# Patient Record
Sex: FEMALE | Race: WHITE | Employment: UNEMPLOYED | URBAN - METROPOLITAN AREA
[De-identification: names, ages, dates, MRNs, and addresses within clinical notes are randomized per-mention and may not be internally consistent; named-entity substitution may affect disease eponyms.]

---

## 2018-08-08 ENCOUNTER — HOSPITAL ENCOUNTER (EMERGENCY)
Age: 63
Discharge: STILL ADMITTED OR ADMINISTRATIVE | End: 2018-08-08
Attending: EMERGENCY MEDICINE
Payer: COMMERCIAL

## 2018-08-08 PROCEDURE — 75810000275 HC EMERGENCY DEPT VISIT NO LEVEL OF CARE

## 2022-10-22 ENCOUNTER — HOSPITAL ENCOUNTER (EMERGENCY)
Age: 67
Discharge: HOME | End: 2022-10-22
Attending: EMERGENCY MEDICINE
Payer: MEDICARE

## 2022-10-22 VITALS
OXYGEN SATURATION: 98 % | WEIGHT: 120 LBS | HEART RATE: 84 BPM | BODY MASS INDEX: 21.26 KG/M2 | RESPIRATION RATE: 20 BRPM | TEMPERATURE: 98 F | HEIGHT: 63 IN | DIASTOLIC BLOOD PRESSURE: 65 MMHG | SYSTOLIC BLOOD PRESSURE: 139 MMHG

## 2022-10-22 DIAGNOSIS — B89 PARASITOSIS: Primary | ICD-10-CM

## 2022-10-22 PROCEDURE — 99282 EMERGENCY DEPT VISIT SF MDM: CPT

## 2022-10-23 NOTE — ED TRIAGE NOTES
Pt presents to ED ambulatory from triage with c/o \"shedding dead worm for 7 weeks\";  pt having a hard time communicating;  she is stuttering and very anxious;  pt sts she went to [facility] and a stool sample was sent off but \"they put specimen in wrong containers and messed sample up\"

## 2022-10-23 NOTE — DISCHARGE INSTRUCTIONS
No gross evidence of parasites found in previous stool sample. It is possible there are other evaluations or skin scrapings to test for parasitosis otherwise neuralgia or another diagnosis should be strongly entertained.

## 2022-10-24 NOTE — ED PROVIDER NOTES
EMERGENCY DEPARTMENT HISTORY AND PHYSICAL EXAM    Date: 10/22/2022  Patient Name: Angelina Montana    History of Presenting Illness     Chief Complaint   Patient presents with    Mental Health Problem         History Provided By: Patient    Additional History (Context):   10:00 PM  Angelina Montana is a 73 y.o. female with PMHX of anxiety, arthritis, chronic pain syndrome, depression, reflux, neuromuscular disorder who presents to the emergency department C/O \"I have worms\". Patient presents complaining of cutaneous hyperesthesias and active worm infection. She states that this is a recurrent problem yet people often do not believe her. She states she is seeing regular physician in addition to emergency department for several visits. She shows me pictures on her cell phone and stool but without grossly visible helminthic etiology. She denies any travel or trips to [facility]. She denies any unfiltered water. Social History  She is a smoker, denies alcohol or drugs. Family History  No specific family history      PCP: Julienne Felty, MD    Current Outpatient Medications   Medication Sig Dispense Refill    oxyCODONE-acetaminophen (PERCOCET) 5-325 mg per tablet Take 1 Tab by mouth every four (4) hours as needed for Pain. 60 Tab 0    cyclobenzaprine (FLEXERIL) 10 mg tablet Take 1 Tab by mouth three (3) times daily as needed for Muscle Spasm(s). 60 Tab 0    clorazepate (TRANXENE) 15 mg tablet Take 15 mg by mouth two (2) times a day. temazepam (RESTORIL) 30 mg capsule Take  by mouth nightly as needed.            Past History     Past Medical History:  Past Medical History:   Diagnosis Date    Arthritis     djd spine    Other ill-defined conditions     hot flashes    Other ill-defined conditions     left arm numbness    Other ill-defined conditions     torn rotator cuff    Other ill-defined conditions     cyst on back    Psychiatric disorder     anxiety       Past Surgical History:  Past Surgical History:   Procedure Laterality Date    HX BREAST BIOPSY      right       Family History:  No family history on file. Social History:  Social History     Tobacco Use    Smoking status: Every Day    Tobacco comments:     currently using patch   Substance Use Topics    Alcohol use: No    Drug use: No       Allergies:  No Known Allergies      Review of Systems   Review of Systems   Constitutional: Negative. HENT: Negative. Eyes: Negative. Respiratory: Negative. Cardiovascular: Negative. Gastrointestinal:  Positive for diarrhea (Loose stool not liquid diarrhea). Endocrine: Negative. Genitourinary: Negative. Musculoskeletal: Negative. Skin: Negative. Itching     Allergic/Immunologic: Negative. Reports recurrent worm infections. Neurological: Negative. Hematological: Negative. Psychiatric/Behavioral:  The patient is nervous/anxious. All other systems reviewed and are negative. Physical Exam     Vitals:    10/22/22 2114 10/22/22 2119   BP:  139/65   Pulse:  84   Resp:  20   Temp: 98 °F (36.7 °C)    SpO2:  98%   Weight: 54.4 kg (120 lb)    Height: 5' 3\" (1.6 m)      Physical Exam  Vitals and nursing note reviewed. Constitutional:       General: She is not in acute distress. Appearance: She is well-developed. She is not diaphoretic. HENT:      Head: Normocephalic and atraumatic. Eyes:      General: No scleral icterus. Extraocular Movements:      Right eye: Normal extraocular motion. Left eye: Normal extraocular motion. Conjunctiva/sclera: Conjunctivae normal.      Pupils: Pupils are equal, round, and reactive to light. Neck:      Trachea: No tracheal deviation. Cardiovascular:      Rate and Rhythm: Normal rate and regular rhythm. Heart sounds: Normal heart sounds. Pulmonary:      Effort: Pulmonary effort is normal. No respiratory distress. Breath sounds: Normal breath sounds. No stridor.    Abdominal:      General: Bowel sounds are normal. There is no distension. Palpations: Abdomen is soft. Tenderness: There is no abdominal tenderness. There is no rebound. Musculoskeletal:         General: No tenderness. Normal range of motion. Cervical back: Normal range of motion and neck supple. Comments: Grossly unremarkable without abnormalities   Skin:     General: Skin is warm and dry. Capillary Refill: Capillary refill takes less than 2 seconds. Findings: No erythema or rash. Neurological:      Mental Status: She is alert and oriented to person, place, and time. GCS: GCS eye subscore is 4. GCS verbal subscore is 5. GCS motor subscore is 6. Cranial Nerves: No cranial nerve deficit. Motor: No weakness. Psychiatric:         Attention and Perception: Attention normal.         Mood and Affect: Mood is anxious. Behavior: Behavior is cooperative. Thought Content: Thought content normal.         Judgment: Judgment normal.      Comments: Patient stuttering repetitive in her history. She constantly repeats herself     Diagnostic Study Results     Labs -  No results found for this or any previous visit (from the past 24 hour(s)). Radiologic Studies -   No orders to display     CT Results  (Last 48 hours)      None          CXR Results  (Last 48 hours)      None            Medications given in the ED-  Medications - No data to display      Medical Decision Making   I am the first provider for this patient. I reviewed the vital signs, available nursing notes, past medical history, past surgical history, family history and social history. Vital Signs-Reviewed the patient's vital signs. Pulse Oximetry Analysis - 98% on room air    Records Reviewed: NURSING NOTES AND PREVIOUS MEDICAL RECORDS    Provider Notes (Medical Decision Making):   Patient with complaints of phlegm infection and I find no evidence of this. She is quite anxious and persistent in her arguments.   Review of her record shows multiple presentations to family practice and emergency room for the same complaint. She shows me multiple pictures. I carefully reviewed this woman's skin with Woods lamp and even otoscope or ophthalmoscope where she pointed out lesions to her skin and legs. Twice she has been treated with mebendazole and albendazole. Her warm tests have apparently been negative. She cannot deliver stool for me here. 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 Procedures:  Procedures    ED Course:   10:00 PM: Initial assessment performed. The patients presenting problems have been discussed, and they are in agreement with the care plan formulated and outlined with them. I have encouraged them to ask questions as they arise throughout their visit. Diagnosis and Disposition       DISCHARGE NOTE:  11:23 PM  Shannan Kerr's  results have been reviewed with her. She has been counseled regarding her diagnosis, treatment, and plan. She verbally conveys understanding and agreement of the signs, symptoms, diagnosis, treatment and prognosis and additionally agrees to follow up as discussed. She also agrees with the care-plan and conveys that all of her questions have been answered. I have also provided discharge instructions for her that include: educational information regarding their diagnosis and treatment, and list of reasons why they would want to return to the ED prior to their follow-up appointment, should her condition change. She has been provided with education for proper emergency department utilization. CLINICAL IMPRESSION:    1. Parasitosis        PLAN:  1. D/C Home  2. Discharge Medication List as of 10/22/2022 11:26 PM        3.    Follow-up Information       Follow up With Specialties Details Why Contact Info    Isatu Interiano MD Infectious Disease Physician   [address]  [phone]      Mahesh Cruz MD Infectious Disease Physician   [address] [place] [address]  [phone]            _______________________________    This note was partially transcribed via voice recognition software. Although efforts have been made to catch any discrepancies, it may contain sound alike words, grammatical errors, or nonsensical words.

## 2022-12-30 ENCOUNTER — HOSPITAL ENCOUNTER (EMERGENCY)
Age: 67
Discharge: HOME | End: 2022-12-30
Attending: EMERGENCY MEDICINE
Payer: MEDICARE

## 2022-12-30 VITALS
TEMPERATURE: 97.5 F | RESPIRATION RATE: 20 BRPM | HEART RATE: 82 BPM | SYSTOLIC BLOOD PRESSURE: 131 MMHG | HEIGHT: 63 IN | BODY MASS INDEX: 20.7 KG/M2 | OXYGEN SATURATION: 100 % | WEIGHT: 116.8 LBS | DIASTOLIC BLOOD PRESSURE: 64 MMHG

## 2022-12-30 DIAGNOSIS — F22 EKBOM'S DELUSIONAL PARASITOSIS (HCC): Primary | ICD-10-CM

## 2022-12-30 PROCEDURE — 87177 OVA AND PARASITES SMEARS: CPT

## 2022-12-30 PROCEDURE — 87324 CLOSTRIDIUM AG IA: CPT

## 2022-12-30 PROCEDURE — 87506 IADNA-DNA/RNA PROBE TQ 6-11: CPT

## 2022-12-30 PROCEDURE — 99282 EMERGENCY DEPT VISIT SF MDM: CPT

## 2022-12-30 NOTE — ED TRIAGE NOTES
Pt report to ed with c/o abdominal pain, diarrhea and parasite in stool (worms). pt report from pt, pt noticed parrasite in stool since sept. Pt have seen PCP for similar complaint, however PCP did not find any worms at this time. Pt report that while she was in the ed lobby waiting, she asked for a toilet had to used the bathroom. Pt report that she passed dead worms in stool. Pt was brought back to room, nurse noted tape Band-Aid on belly button. Pt verbalized to nurse that she put it there to prevent the worms from coming out. Small Band-Aid were also noted to left lower leg. Pt also pointed to left upper arm at what appeared to be by nurse a varicose vein, stating, \" see, that's the worm moving around\". Pt was tearful in room with nurse. Pt also appeared to be agitated. Pt was noted drinking coffee in room during nurse - patient interview. HX of  chronic back pain and anxiety.

## 2022-12-30 NOTE — ED PROVIDER NOTES
EMERGENCY DEPARTMENT HISTORY AND PHYSICAL EXAM    Date: 12/30/2022  Patient Name: Saira Linder    History of Presenting Illness     Chief Complaint   Patient presents with    Abdominal Pain    Diarrhea         History Provided By: Patient    Chief Complaint: diarrhea    HPI(Context):   10:21 AM  Saira Linder is a 78 y.o. female with PMHX of anxiety who presents to the emergency department C/O diarrhea. Pt notes there are 2 inch black and white worms in her stool. She notes she can see the worms crawling on her skin and points to moles and scars. Pt has had sxs for almost a year. She has seen multiple specialists including GI with no clear findings. Pt has been given multiple rounds of antiparasitic meds. Pt is requesting that I \"order all the tests\" on her stool. Pt has 2 toilet hats at bedside full of loose brown stool. She is requesting me to evaluate the stool and repeatedly holding open stool container and gesturing toward me. Pt denies fever, vomiting, bloody stools. and any other sxs or complaints. PCP: Kenn Rizo MD    Current Outpatient Medications   Medication Sig Dispense Refill    clorazepate (TRANXENE) 15 mg tablet Take 15 mg by mouth two (2) times a day. temazepam (RESTORIL) 30 mg capsule Take  by mouth nightly as needed. Past History     Past Medical History:  Past Medical History:   Diagnosis Date    Arthritis     djd spine    Other ill-defined conditions     hot flashes    Other ill-defined conditions     left arm numbness    Other ill-defined conditions     torn rotator cuff    Other ill-defined conditions     cyst on back    Psychiatric disorder     anxiety       Past Surgical History:  Past Surgical History:   Procedure Laterality Date    HX BREAST BIOPSY      right       Family History:  No family history on file.     Social History:  Social History     Tobacco Use    Smoking status: Every Day    Tobacco comments:     currently using patch   Substance Use Topics    Alcohol use: No    Drug use: No       Allergies:  No Known Allergies      Review of Systems   Review of Systems   Constitutional:  Negative for fever. Gastrointestinal:  Positive for diarrhea. Negative for abdominal pain, blood in stool and vomiting. Psychiatric/Behavioral:  Positive for agitation and suicidal ideas. All other systems reviewed and are negative. Physical Exam     Vitals:    12/30/22 1027   BP: 131/64   Pulse: 82   Resp: 20   Temp: 97.5 °F (36.4 °C)   SpO2: 100%   Weight: 53 kg (116 lb 12.8 oz)   Height: 5' 3\" (1.6 m)     Physical Exam  Vitals and nursing note reviewed. Constitutional:       General: She is not in acute distress. Appearance: She is well-developed. She is not diaphoretic. Comments: Anxious  female. Alert. HENT:      Head: Normocephalic and atraumatic. Right Ear: External ear normal.      Left Ear: External ear normal.      Nose: Nose normal.   Eyes:      General: No scleral icterus. Right eye: No discharge. Left eye: No discharge. Conjunctiva/sclera: Conjunctivae normal.   Cardiovascular:      Rate and Rhythm: Normal rate and regular rhythm. Heart sounds: Normal heart sounds. No murmur heard. No friction rub. No gallop. Pulmonary:      Effort: Pulmonary effort is normal. No tachypnea, accessory muscle usage or respiratory distress. Breath sounds: Normal breath sounds. No decreased breath sounds, wheezing, rhonchi or rales. Abdominal:      General: There is no distension. Palpations: Abdomen is soft. Tenderness: There is no abdominal tenderness. There is no guarding or rebound. Musculoskeletal:         General: Normal range of motion. Cervical back: Normal range of motion. Skin:     General: Skin is warm and dry. Neurological:      Mental Status: She is alert and oriented to person, place, and time. Psychiatric:         Mood and Affect: Mood is anxious. Affect is angry. Behavior: Behavior is agitated and aggressive. Judgment: Judgment normal.           Diagnostic Study Results     Labs -   No results found for this or any previous visit (from the past 12 hour(s)). Radiologic Studies     No orders to display     CT Results  (Last 48 hours)      None          CXR Results  (Last 48 hours)      None            Medications given in the ED-  Medications - No data to display      Medical Decision Making   I am the first provider for this patient. I reviewed the vital signs, available nursing notes, past medical history, past surgical history, family history and social history. Vital Signs-Reviewed the patient's vital signs. Pulse Oximetry Analysis - 100% on RA. NORMAL     Records Reviewed: Nursing Notes, Old Medical Records, Previous Radiology Studies, and Previous Laboratory Studies    Provider Notes (Medical Decision Making): food borne infection, viral, parasitosis, psych    Procedures:  Procedures    ED Course:   10:21 AM Initial assessment performed. The patients presenting problems have been discussed, and they are in agreement with the care plan formulated and outlined with them. I have encouraged them to ask questions as they arise throughout their visit. Diagnosis and Disposition       Stool studies ordered per patient request. Benign abdomen with no indication for CT. Pt has had sxs for nearly a year and has seen multiple specialists with no clear findings. Difficult to get history from patient as she repeatedly pointing to her skin claiming there are worms in her skin. She flashes her genitals toward me on multiple occassions despite my repeated requests to cover her private areas. 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 She was becoming more agitated and aggressive, so I discontinued conversation. LUNA Alegria was at bedside for full HPI and physical exam. Attending aware of case.          1. Ekbom's delusional parasitosis ([facility] [address]        PLAN:  1. D/C Home  2. Discharge Medication List as of 12/30/2022 11:30 AM        CONTINUE these medications which have NOT CHANGED    Details   clorazepate (TRANXENE) 15 mg tablet Take 15 mg by mouth two (2) times a day.  , Historical Med      temazepam (RESTORIL) 30 mg capsule Take  by mouth nightly as needed.  , Historical Med           STOP taking these medications       oxyCODONE-acetaminophen (PERCOCET) 5-325 mg per tablet Comments:   Reason for Stopping:         cyclobenzaprine (FLEXERIL) 10 mg tablet Comments:   Reason for Stopping:             3.   Follow-up Information       Follow up With Specialties Details Why Contact Info    Jermaine Nash MD Infectious Disease Physician  may follow up with infectious disease as requested [address]      [address] [facility],[address], Larry Napoles MD Family Medicine   [facility] [address] [address] [phone]      [facility] EMERGENCY DEPT Emergency Medicine   [address] [place] [address]  [phone]          _______________________________    Attestations: This note is prepared by Stacia Duncan PA-C.  _______________________________        Please note that this dictation was completed with [employer], the computer voice recognition software. Quite often unanticipated grammatical, syntax, homophones, and other interpretive errors are inadvertently transcribed by the computer software. Please disregard these errors. Please excuse any errors that have escaped final proofreading.

## 2022-12-30 NOTE — ED NOTES
I have reviewed discharge instructions with the patient. The patient verbalized understanding.  Patient leaves discharge instructions in room, patient returns to registration desk for discharge papers

## 2022-12-31 LAB
C DIFF GDH STL QL: POSITIVE
C DIFF TOX A+B STL QL IA: POSITIVE
CAMPYLOBACTER SPECIES, DNA: NEGATIVE
ENTEROTOXIGEN E COLI, DNA: NEGATIVE
INTERPRETATION: ABNORMAL
P SHIGELLOIDES DNA STL QL NAA+PROBE: NEGATIVE
SALMONELLA SPECIES, DNA: NEGATIVE
SHIGA TOXIN PRODUCING, DNA: NEGATIVE
SHIGELLA SP+EIEC IPAH STL QL NAA+PROBE: NEGATIVE
VIBRIO SPECIES, DNA: NEGATIVE
Y. ENTEROCOLITICA, DNA: NEGATIVE

## 2022-12-31 RX ORDER — VANCOMYCIN HYDROCHLORIDE 125 MG/1
125 CAPSULE ORAL 4 TIMES DAILY
Qty: 56 CAPSULE | Refills: 0 | Status: SHIPPED | OUTPATIENT
Start: 2022-12-31 | End: 2023-01-14

## 2022-12-31 NOTE — CALL BACK NOTE
11:45 AM  Patient stool tested positive for C. difficile and sent oral vancomycin to preferred pharmacy. Left voicemail message for patient. Has no emergency contact. - bill pak

## 2022-12-31 NOTE — CALL BACK NOTE
1:17 PM  Patient returned phone call I informed her of her C. difficile diagnosis and adherence to oral medication regimen. Emphasized that it is the most important antibiotic that she will need to take. Patient had multiple questions have addressed them. She says that she has an upcoming infectious disease appointment and I encouraged her to keep that. Iterated to her that she is not \"passing worms. \"- bill pak

## 2023-01-07 ENCOUNTER — HOSPITAL ENCOUNTER (EMERGENCY)
Age: 68
Discharge: HOME | End: 2023-01-07
Attending: EMERGENCY MEDICINE
Payer: MEDICARE

## 2023-01-07 ENCOUNTER — APPOINTMENT (OUTPATIENT)
Dept: GENERAL RADIOLOGY | Age: 68
End: 2023-01-07
Attending: PHYSICIAN ASSISTANT
Payer: MEDICARE

## 2023-01-07 VITALS
DIASTOLIC BLOOD PRESSURE: 74 MMHG | HEART RATE: 82 BPM | OXYGEN SATURATION: 97 % | SYSTOLIC BLOOD PRESSURE: 156 MMHG | HEIGHT: 63 IN | WEIGHT: 113 LBS | TEMPERATURE: 98 F | BODY MASS INDEX: 20.02 KG/M2 | RESPIRATION RATE: 19 BRPM

## 2023-01-07 DIAGNOSIS — K59.00 CONSTIPATION, UNSPECIFIED CONSTIPATION TYPE: Primary | ICD-10-CM

## 2023-01-07 DIAGNOSIS — A04.72 C. DIFFICILE DIARRHEA: ICD-10-CM

## 2023-01-07 DIAGNOSIS — R10.84 ABDOMINAL PAIN, GENERALIZED: ICD-10-CM

## 2023-01-07 DIAGNOSIS — F22 EKBOM'S DELUSIONAL PARASITOSIS (HCC): ICD-10-CM

## 2023-01-07 PROCEDURE — 99283 EMERGENCY DEPT VISIT LOW MDM: CPT

## 2023-01-07 PROCEDURE — 74019 RADEX ABDOMEN 2 VIEWS: CPT

## 2023-01-07 RX ORDER — METRONIDAZOLE 500 MG/1
500 TABLET ORAL 3 TIMES DAILY
Qty: 30 TABLET | Refills: 0 | Status: SHIPPED | OUTPATIENT
Start: 2023-01-07 | End: 2023-01-17

## 2023-01-08 NOTE — ED TRIAGE NOTES
Pt reports that for the last 5 months she has been pooping dead shedding worms out. Pt reports she has had c.diff and asked who is the doctor on duty. Pt is not very clear of story.  [facility]

## 2023-01-08 NOTE — ED PROVIDER NOTES
EMERGENCY DEPARTMENT HISTORY AND PHYSICAL EXAM      Date: 1/7/2023  Patient Name: Gil Pham    History of Presenting Illness     Chief Complaint   Patient presents with    Abdominal Pain       History Provided By: Patient    HPI: Gil Pham, 78 y.o. female with past medical history to include psychiatric disorder, arthritis, chronic pain, delusion of parasitosis, currently being treated for C. difficile infection is presenting to the emergency department with a chief complaint of abdominal pain. Patient has a difficult time describing her abdominal pain as she is stuck on the fact that she has worms all over her body including in her right thigh and in her stool. She has multiple containers of stool at bedside which she is showing to this provider as well as a container of phlegm that she reports that she coughed up that has worms located in it. She denies fever and blood in stool. Patient also reported that she was only able to  off of the vancomycin that was prescribed to her due to cost reasons. There are no other complaints, changes, or physical findings at this time. Past History     Past Medical History:  Past Medical History:   Diagnosis Date    Arthritis     djd spine    Other ill-defined conditions     hot flashes    Other ill-defined conditions     left arm numbness    Other ill-defined conditions     torn rotator cuff    Other ill-defined conditions     cyst on back    Psychiatric disorder     anxiety       Past Surgical History:  Past Surgical History:   Procedure Laterality Date    HX BREAST BIOPSY      right       Family History:  No family history on file.     Social History:  Social History     Tobacco Use    Smoking status: Every Day    Tobacco comments:     currently using patch   Substance Use Topics    Alcohol use: No    Drug use: No       Allergies:  No Known Allergies    PCP: Elvia Jimenez MD    No current facility-administered medications on file prior to encounter. Current Outpatient Medications on File Prior to Encounter   Medication Sig Dispense Refill    clorazepate (TRANXENE) 15 mg tablet Take 15 mg by mouth two (2) times a day. temazepam (RESTORIL) 30 mg capsule Take  by mouth nightly as needed. Review of Systems   Review of Systems   Constitutional:  Negative for activity change, chills and fever. HENT:  Negative for congestion, ear pain, rhinorrhea, sneezing and sore throat. Eyes:  Negative for pain and visual disturbance. Respiratory:  Negative for cough and shortness of breath. Cardiovascular:  Negative for chest pain. Gastrointestinal:  Positive for abdominal pain. Negative for blood in stool, diarrhea, nausea and vomiting. Genitourinary:  Negative for dysuria and hematuria. Musculoskeletal:  Negative for gait problem. Skin:  Negative for rash. Neurological:  Negative for speech difficulty, weakness and headaches. Psychiatric/Behavioral:  The patient is not nervous/anxious. Delusions   All other systems reviewed and are negative. Physical Exam   Physical Exam  Vitals and nursing note reviewed. Constitutional:       General: She is not in acute distress. Appearance: Normal appearance. She is not ill-appearing or toxic-appearing. HENT:      Head: Normocephalic and atraumatic. Nose: Nose normal.      Mouth/Throat:      Mouth: Mucous membranes are moist.      Pharynx: Oropharynx is clear. Eyes:      Extraocular Movements: Extraocular movements intact. Conjunctiva/sclera: Conjunctivae normal.      Pupils: Pupils are equal, round, and reactive to light. Cardiovascular:      Rate and Rhythm: Normal rate. Pulses: Normal pulses. Heart sounds: Normal heart sounds. Pulmonary:      Effort: Pulmonary effort is normal. No respiratory distress. Breath sounds: Normal breath sounds. Abdominal:      General: Abdomen is flat. Bowel sounds are normal. There is no distension. Palpations: Abdomen is soft. Tenderness: There is generalized abdominal tenderness (mild). There is no guarding or rebound. Comments: Patient has a piece of gauze taped over the middle of her abdomen just below her umbilicus without any wound underneath of the gauze, no skin changes   Musculoskeletal:         General: No deformity or signs of injury. Normal range of motion. Cervical back: Normal range of motion. Skin:     General: Skin is warm and dry. Capillary Refill: Capillary refill takes less than 2 seconds. Findings: No rash. Neurological:      General: No focal deficit present. Mental Status: She is alert and oriented to person, place, and time. Cranial Nerves: No cranial nerve deficit. Psychiatric:         Attention and Perception: She is inattentive. She perceives visual (parasites) hallucinations. Mood and Affect: Mood is anxious. Speech: Speech is tangential.         Thought Content: Thought content does not include homicidal or suicidal ideation. Lab and Diagnostic Study Results   Labs -   No results found for this or any previous visit (from the past 12 hour(s)). Radiologic Studies -   XR Results (most recent):  Results from Hospital Encounter encounter on 01/07/23    XR ABD FLAT/ ERECT    Narrative  EXAM: XR ABD FLAT/ ERECT    CLINICAL INDICATION/HISTORY: abdominal pain    COMPARISON: None. TECHNIQUE: Supine and upright AP views of the abdomen were obtained.    _______________    FINDINGS:    Normal bowel gas pattern. No air distended bowel loops nor other evidence of  bowel obstruction or bowel dilatation. Moderate fecal material is seen  throughout the colon, most suggestive of constipation. The soft tissue planes and bony structures appear normal.    _______________    Impression  No bowel obstruction or other acute findings seen.       @lastxrresult@  CT Results  (Last 48 hours)      None          CXR Results  (Last 48 hours) None            Medical Decision Making and ED Course   Differential Diagnosis & Medical Decision Making Provider Note:   70-year-old female who is well-known to this facility and has a documented history of delusional parasitosis is presenting with abdominal pain. She is very distracted by her delusions. There is no visualization of any worms or parasites on her body. She has an abnormal affect. Her abdomen is soft and mildly generally tender. Her x-ray does reveal constipation and she has a recent diagnosis of C. Difficile from stool collection on 12/30. Stool negative for any ova or parasites. Patient has been unable to finish her vancomycin oral as prescribed to treat C. difficile due to the cost.  After discussion with ED attending, will change medication to Flagyl for cost effectiveness and patient compliance. This was explained to the patient who voices understanding. I also encouraged her to follow-up with GI specialist for her C. difficile diagnosis. Considered sepsis and toxic megacolon however abdomen relatively soft, no tachycardia, and afebrile. Discussed case with Dr. Rain Mcneal, ED Attending who advises flat and erect abdominal x-ray to rule out any free air or megacolon. Patient has stable vital signs, afebrile, no tachycardia, no concern for sepsis at this time. - I am the first provider for this patient. I reviewed the vital signs, available nursing notes, past medical history, past surgical history, family history and social history. The patients presenting problems have been discussed, and they are in agreement with the care plan formulated and outlined with them. I have encouraged them to ask questions as they arise throughout their visit. Vital Signs-Reviewed the patient's vital signs.   Patient Vitals for the past 12 hrs:   Temp Pulse Resp BP SpO2   01/07/23 1918 -- 82 19 -- --   01/07/23 1915 -- -- -- (!) 156/74 --   01/07/23 1914 98 °F (36.7 °C) -- -- -- 97 %       ED Course:        Procedures   Performed by: Juan M De Paz PA-C  Procedures      Disposition   Disposition: DC- Adult Discharges: All of the diagnostic tests were reviewed and questions answered. Diagnosis, care plan and treatment options were discussed. The patient understands the instructions and will follow up as directed. The patients results have been reviewed with them. They have been counseled regarding their diagnosis. The patient verbally convey understanding and agreement of the signs, symptoms, diagnosis, treatment and prognosis and additionally agrees to follow up as recommended with their PCP in 24 - 48 hours. They also agree with the care-plan and convey that all of their questions have been answered. I have also put together some discharge instructions for them that include: 1) educational information regarding their diagnosis, 2) how to care for their diagnosis at home, as well a 3) list of reasons why they would want to return to the ED prior to their follow-up appointment, should their condition change. Discharged     DISCHARGE PLAN:  1. Current Discharge Medication List        CONTINUE these medications which have NOT CHANGED    Details   vancomycin (VANCOCIN) 125 mg capsule Take 1 Capsule by mouth four (4) times daily for 14 days. Qty: 56 Capsule, Refills: 0      clorazepate (TRANXENE) 15 mg tablet Take 15 mg by mouth two (2) times a day. temazepam (RESTORIL) 30 mg capsule Take  by mouth nightly as needed. 2.   Follow-up Information       Follow up With Specialties Details Why Contact Info    Dick Guthrie MD Gastroenterology Schedule an appointment as soon as possible for a visit  for follow up from ER visit [address] Ami Cutler [address] [facility] [address] [facility]      [facility] EMERGENCY DEPT Emergency Medicine  As needed, If symptoms worsen [address] [facility] [address] [phone]          3. Return to ED if worse   4.    Discharge Medication List as of 1/7/2023  9:33 PM        START taking these medications    Details   metroNIDAZOLE (FlagyL) 500 mg tablet Take 1 Tablet by mouth three (3) times daily for 10 days. , Normal, Disp-30 Tablet, R-0           CONTINUE these medications which have NOT CHANGED    Details   clorazepate (TRANXENE) 15 mg tablet Take 15 mg by mouth two (2) times a day.  , Historical Med      temazepam (RESTORIL) 30 mg capsule Take  by mouth nightly as needed.  , Historical Med           STOP taking these medications       vancomycin (VANCOCIN) 125 mg capsule Comments:   Reason for Stopping:             Diagnosis/Clinical Impression     Clinical Impression:   1. Constipation, unspecified constipation type    2. Abdominal pain, generalized    3. C. difficile diarrhea    4. Ekbom's delusional parasitosis ([facility] [address]        Attestations: Jean Paul COLLAZO PA-C, am the primary clinician of record. Please note that this dictation was completed with [employer], the computer voice recognition software. Quite often unanticipated grammatical, syntax, homophones, and other interpretive errors are inadvertently transcribed by the computer software. Please disregard these errors. Please excuse any errors that have escaped final proofreading. Thank you.

## 2023-01-08 NOTE — DISCHARGE INSTRUCTIONS
Please stop taking the vancomycin, we will switch her antibiotics to Flagyl which is easier to obtain due to cost.  This will treat the bacterial infection of your colon. Your x-ray also shows some constipation but no other abnormalities.   Please follow-up with the GI specialist.

## 2023-01-12 ENCOUNTER — DOCUMENTATION ONLY (OUTPATIENT)
Dept: INFECTIOUS DISEASES | Age: 68
End: 2023-01-12

## 2023-01-12 NOTE — PROGRESS NOTES
This note has been imported from an outside medical record to facilitate physician to physician communication in the patient's care. Format may be different from original.       INFECTIOUS DISEASE CONSULT  HISTORY  Chief Complaint / [place] of Presenting Problem: Parasites  History Of Present Illness: Ms Shiraz Steele is a 77 yo female who is referred to ID clinic due to concern for a parascitic infection of the skin and bowels. She is accompanied by her sister to today's appointment. Note that the patient had missed 2 prior appointments at my office and this is her third time being re-scheduled. Ms. Shiraz Steele reflects that she has not been feeling well since at least August. She notes that she has been having intermittent diarrhea with espisodes of worms and their eggs being excreted from her stool. Ne notes that she sees long, black worms in her stool as well as crawling in her skin. She brings photos of her stool and points out the items of concern. She has been to see Dermatology as well as GI, her PCP, [facility] as well as the ED at [facility] in an attempt to get to the bottom of her illness. She has been perscribed 3 full courses of mebendazole (3 tabs x 3 days, repeated in 1 week per course) as well as 1 course of albendazole. She recently had gone to the ED on 12/30 for persistent diarrhea at which time stool for O&P was negative, stool for enteric pathogen testing was negative, but C.diff was +. She had first been given a script for po vancomycin which was too expensive and was therefore changed to po flagyl. Her sister states that the patient has been taking the Flagyl as perscribed but I am unable to confirm this with the patient. The patient states that she has been having less diarrhea. No fevers reported. No abdominal cramping or blood in the stool reported. She does follow with GI who has provided recommendations regarding C.diff per my review of outside records.      The patient describes worms that pop in and out of her skin, her umbilicus, and in her teeth. She notes that she sees a black worm with horns in both of her eyes if she closes her eyes or puts pressure on her eyelids. She tells me that she is not able to see anything but the worms from time to time. She notes that the parasite in her skin move and form black lines, sometime in a 'Y' shape. During the  visit she points out areas which cause her concern- feet, lower legs, and abdomen. She notes a cysts on her lower abdomen which was evaluated by gen Surgery with recommendations to hold on cyst removal given the patient's ongoing concerns for parasites in the cyst. Today she does not mention abdominal pain, although per chart review she has called her PCP and other providers every 5-7 days with this complaint. She notes that she only eats 1 meal per day and its from [employer] becasue it is the only food that she trusts. She has not been eating regularly since September. Her sister states that the patient seems to be losing weight. She lives with her sister near [place]. No pets are in the home. She has never travelled outside of the [facility] [place] than to occasionally visit her other sister in [place]. The patient's sister shares that she is quite worried about the patient's overaall well-being and that she has been declining physically and congitively since the episodes with seeing parasites has started. Medication List: Xanax 1 mg po qid  gabapentin 600 mg po tid  cloraepate 15 mg po daily    Flagyl (temporary)  Allergy List: NKDA  Past Medical History: Spinal stenosis  Lumabr disc protrusion  osteoporosis  Severe Anxiety disorder  Depression  Recurrent UTI  Fibromyalgia  Sebaceous cyst  Past Surgical History: Breast biopsy  Social History: Single, lives in a house in a [facility] area near [place] with her sister. No pets. No international travel. No travel outside of [place].  Works occasionally as a cleaning tech. + daily smoking. No recreational drugs  Family History: Mother: thyroid disease  Patient did not want to disclose the medical problems of her sisters  PCP: Funmi Mittal MD  REVIEW OF SYSTEMS  General: No fever, chills, or sweats reported. Sister reports weight loss and change in eating habits  Eyes: + worms and black spots in her vision  Ears/Nose/Mouth/Throat: No hearing loss, no sinus pain or pressure.  + discolration of teeth  + patient feels worms crawling into her teeth  Respiratory: No upper respiratory infections, dyspnea, cough or wheezing. Cardiovascular: No chest pain, tightness or palpitations. Gastrointestinal: + intermittent diarrhea  + prior abdominal pain- not reported to me today  + worms and eggs in stool since August    Genitourinary: No incontinence.  + burning and occasional dysuria  Musculoskeletal: No falls in last 12 months  No reported redness or swelling. No muscle cramps  + back pain   Neurological: No focal weakness. No headaches. No numbness or tingling. No seizures. No neuropathy  No gait ataxia reported. No falls reported. No loss of consciousness, transient ischemic symptoms, or seizures. Skin/Breast: + itching  + worms crawling in her skin  Psychiatric: +Anxiety, not sleeping  Endocrine: No increased thirst, hunger, or urination. No heat or cold intolerance. Hematological/Lymphatic: No increased peripheral edema or swelling. No unusual bleeding or bruising. No new masses. No signs or symptoms of bleeding  No reported lymph node enlargement. Allergic/Immunologic: No environmental allergies. VITAL SIGNS/CONSTITUTIONAL  Weight: 116.4 pounds; Pulse: 71 BPM; Blood Pressure: 136/76; O2 Saturation: 100 Room Air; Temperature: 97.1 Fahrenheit; Respiratory Rate: 14 Breaths per minute;  PHYSICAL EXAM  General: This patient is well developed and in no acute distress.   Eyes: Conjunctivae and lids appear normal.  Pupils equal, round, and normally reactive to light.  Ears/Nose/Mouth/Throat: Hearing grossly intact. Pharynx is not injected. Mouth without lesions. Overall appearance normal with no scars, lesions or masses. Neck: Supple without thyromegaly, lymphadenopathy, or masses. Respiratory: Normal respiratory effort. No crackles, wheezes or ronchi. Bases clear. Cardiovascular: RRR, s1 and s2, no murmurs or gallops. No peripheral edema. Edema/Varicosities of Extremities: + varicosities  Gastrointestinal: Soft, non-distended. Active bowel sounds throughout  Non-tender. No hepato-splenomegaly. Genitourinary: Deferred  Lymphatics: No palpable adenopathy. Musculoskeletal: Digits/nails: No clubbing, cyanosis, inflammation or ischemia. Gait/Station: Normal gait. No joint deformity, swelling, redness or muscle weakness. Full ROM of joints. No effusions noted. Muscle mass appropriate for age. Skin: Warm and dry. + patchy excoriation to right anterior lower leg- no erythema or draiange; + flaky dry skin  Small eschar to lower abdomen with palpable cyst    Notably, I did NOT visualize ANY lesions concerning for a parastici infection- no tracking, no lymphangitis, no linear excoriations, no abscesses. Specific attention to areas of concern patient pointed out- varicosities on lower legs with cellulite and age related skin changes. Pressure marks from clothing, sock and shoes. No bruising   Neurological: No focal weakness. Cranial nerves grossly intact. Mentation clear. Speech clear, pressured. Gait stable. No tremors. Difficult  historian. Psychiatric: Pressured speech. Difficult historian- unable to create linear timeline of events. Required frequent re-direction away from her phone photos and picking at her skin. Anxious. Repetative. Interrupts often with perseverating thoughts about worms. Difficult time answering questions directly.    LABS/RADIOLOGY/TESTS  Labs: 10/4/22 Stool O&P- negative    11/7/22   shiga toxin, Campylobacter Ag: negative  stool O&P- negative  Stool cultures- negative  12/28/22  O&P negative  12/30/22:   C.diff Ag +  Ova and parasite- negative  Enteric bacterial panel - negative    9/12/22: CT abd/pelvis:   IMPRESSION   Fluid filled small bowel and colon suggesting diarrheal disease/enteritis. 12/22/22 Upper GI with small bowel ft:   REPORT:    radiograph of the abdomen is unremarkable. There is a nonobstructing bowel gas pattern. Air-contrast images of the esophagus are unremarkable. There is normal esophageal motility with normal esophageal mucosal contour. No intraluminal filling defect, mucosal irregularity, or abnormal extrinsic compression. Gastroesophageal reflux is not elicited on the exam.     Air-contrast images of the stomach and duodenal bulb are normal.  No mucosal irregularity, intraluminal filling defect, or abnormal extrinsic compression. The stomach readily empties into the C sweep of the duodenum. There is a small duodenal diverticulum. There is a small bowel transit time of approximately 60 minutes. The small bowel has a normal mucosal contour. There is no mucosal irregularity, intraluminal filling defect, or abnormal extrinsic compression. The small bowel readily empties into the colon. The cecum is within the right pelvis and the visualized terminal ileum is normal.  Terminal ileum is difficult to visualize secondary to overlying bowel loops. 1/7/23 KUB: no bowel obstruction or other findings    CBC, BMP from [facility] reviewed from 10/26 and 9/22- specifically no abnormal Eosinophils reported  DIAGNOSIS AND ASSESSMENT  Assessment:  CPT Codes:  91490, 34394, 99607  ICD Codes:  A04.72: Clostridium difficile colitis  - 12/30 stool for C./diff +  - started on po vanc by [facility], then changed to Flagyl  - suspect related to prior antibitoic use for UTI ( had been given Macrobid for K. pneumo UTI)  R19.7: Diarrhea  - intermittent.  Not currently reported, but within the last week  - follows with GI  - stool cx, stool for enteric pathogens, giardia, and O&P all negative  - CT abd pelvis from Sept 2022 with possible enteritis  - upper GI with SB follow-through done 12/22- no concerning findings. - KUB 1/3/23- no acute findings. F22: Delusions of parasitosis  - patient describes sensation and visualization of worms in eyes, skin, abdomen. - > 5 stool for O&P are negative. Patient does not believe negative test results. - completed mebendazole x 3 courses and albendazole x 1 course. F41.9: Anxiety disorder  Plan: Lengthy discussion with the patient and her sister regarding how to diagnose and treat parasitic infections. Provided ongoing education regarding types of parasitic infections and what we as physicians can test for and see from the macroscopic to microscopic levels. Explained that if a worm and eggs cand be seen with the naked eye, then the ova and parasite test will be positive. Reminded the patient she has more than 5 NEGATIVE O&P tests. Advised patient to continue treatement for C.diff colitis. She need to finish 14 days of Flagyl 500 mg po tid. Discussed cleaning the home and hard surfaces with bleach and allowing the bleach to sit for 3-5 minutes to kill C.diff spores. Discussed risk for recurrence as well as potential for other members of the household to get infected as well. Discussed starting a probiotics or alternately Activia yogurt if probiotics are too expensive. Discussed healthy diet and trying to re-incorporate more foods into her daily diet other than [employer] to avoid further weight loss and to promote healthy GI biome zeenat    Sent UA and reflex culture due to complaints of dysuria  Sent Skin scraping of the right lower anterior leg for PCR testing and extensive skin bio-ID panel  Sent rectal swab for enteric pathogens, viral panel, and PCR testing for >12 parasitic infections most common in the [place].    - anticipate results to return win 2 business days.    Discussed with the patient wheter or not she is able to accept the results of these tests and how we can move forward if she does not. Reassured the patient that I will absolutely treat her for a parasite if one is detected but that she also needs to think about how she would manage a negative series of results. If testing is negative, I have no further recommendations outside of ongoing psychiatric care. While the patient was in the restroom providing urine sample, I discussed with her sister that I feel the patient needs to have strong psychiatric care and frequent counseling. The sister agrees that the patient is having obscessive and invasive thoughts that are causing her physical and emotional harm. The sister states that the patient is under immense stress and does get treatement for anxiety. I have suggested immediate appointment with the patient;s therpaist/psychiatrist.     No antibitoics at this time other than to continue prior script for po Flagyl. No antiparistic medications at this time. Will call patient with results of Urine, Skin and GI testing when results are availble. Further recommendations based on testing results. Physician time (not including nursing staff) spent in reviewing old records, face to face time with the patient, counseling patient and family, ordering and explaining testing, care coordination and documentation: start of visit: 11:15 am- time of visit/documentation/review completion- 2:05 pm- 170 minutes.

## 2023-05-09 ENCOUNTER — HOSPITAL ENCOUNTER (EMERGENCY)
Facility: HOSPITAL | Age: 68
Discharge: HOME | End: 2023-05-09
Attending: EMERGENCY MEDICINE
Payer: MEDICARE

## 2023-05-09 ENCOUNTER — APPOINTMENT (OUTPATIENT)
Facility: HOSPITAL | Age: 68
End: 2023-05-09
Payer: MEDICARE

## 2023-05-09 VITALS
DIASTOLIC BLOOD PRESSURE: 75 MMHG | WEIGHT: 114 LBS | RESPIRATION RATE: 16 BRPM | TEMPERATURE: 98.2 F | OXYGEN SATURATION: 100 % | HEIGHT: 63 IN | SYSTOLIC BLOOD PRESSURE: 131 MMHG | BODY MASS INDEX: 20.2 KG/M2 | HEART RATE: 72 BPM

## 2023-05-09 DIAGNOSIS — R10.9 ABDOMINAL PAIN, UNSPECIFIED ABDOMINAL LOCATION: Primary | ICD-10-CM

## 2023-05-09 LAB
ALBUMIN SERPL-MCNC: 3.7 G/DL (ref 3.4–5)
ALBUMIN/GLOB SERPL: 1.3 (ref 0.8–1.7)
ALP SERPL-CCNC: 68 U/L (ref 45–117)
ALT SERPL-CCNC: 30 U/L (ref 13–56)
ANION GAP SERPL CALC-SCNC: 1 MMOL/L (ref 3–18)
APPEARANCE UR: CLEAR
AST SERPL-CCNC: 13 U/L (ref 10–38)
BASOPHILS # BLD: 0.1 K/UL (ref 0–0.1)
BASOPHILS NFR BLD: 1 % (ref 0–2)
BILIRUB SERPL-MCNC: 0.3 MG/DL (ref 0.2–1)
BILIRUB UR QL: NEGATIVE
BUN SERPL-MCNC: 13 MG/DL (ref 7–18)
BUN/CREAT SERPL: 20 (ref 12–20)
CALCIUM SERPL-MCNC: 9.1 MG/DL (ref 8.5–10.1)
CHLORIDE SERPL-SCNC: 113 MMOL/L (ref 100–111)
CO2 SERPL-SCNC: 29 MMOL/L (ref 21–32)
COLOR UR: YELLOW
CREAT SERPL-MCNC: 0.66 MG/DL (ref 0.6–1.3)
DIFFERENTIAL METHOD BLD: ABNORMAL
EOSINOPHIL # BLD: 0.1 K/UL (ref 0–0.4)
EOSINOPHIL NFR BLD: 2 % (ref 0–5)
ERYTHROCYTE [DISTWIDTH] IN BLOOD BY AUTOMATED COUNT: 13.8 % (ref 11.6–14.5)
GLOBULIN SER CALC-MCNC: 2.8 G/DL (ref 2–4)
GLUCOSE SERPL-MCNC: 73 MG/DL (ref 74–99)
GLUCOSE UR STRIP.AUTO-MCNC: NEGATIVE MG/DL
HCT VFR BLD AUTO: 41 % (ref 35–45)
HGB BLD-MCNC: 13.4 G/DL (ref 12–16)
HGB UR QL STRIP: NEGATIVE
IMM GRANULOCYTES # BLD AUTO: 0 K/UL (ref 0–0.04)
IMM GRANULOCYTES NFR BLD AUTO: 0 % (ref 0–0.5)
KETONES UR QL STRIP.AUTO: NEGATIVE MG/DL
LEUKOCYTE ESTERASE UR QL STRIP.AUTO: NEGATIVE
LIPASE SERPL-CCNC: 212 U/L (ref 73–393)
LYMPHOCYTES # BLD: 3.4 K/UL (ref 0.9–3.6)
LYMPHOCYTES NFR BLD: 42 % (ref 21–52)
MCH RBC QN AUTO: 30.9 PG (ref 24–34)
MCHC RBC AUTO-ENTMCNC: 32.7 G/DL (ref 31–37)
MCV RBC AUTO: 94.7 FL (ref 78–100)
MONOCYTES # BLD: 0.5 K/UL (ref 0.05–1.2)
MONOCYTES NFR BLD: 6 % (ref 3–10)
NEUTS SEG # BLD: 3.9 K/UL (ref 1.8–8)
NEUTS SEG NFR BLD: 49 % (ref 40–73)
NITRITE UR QL STRIP.AUTO: NEGATIVE
NRBC # BLD: 0 K/UL (ref 0–0.01)
NRBC BLD-RTO: 0 PER 100 WBC
PH UR STRIP: 5.5 (ref 5–8)
PLATELET # BLD AUTO: 291 K/UL (ref 135–420)
PMV BLD AUTO: 9 FL (ref 9.2–11.8)
POTASSIUM SERPL-SCNC: 4 MMOL/L (ref 3.5–5.5)
PROT SERPL-MCNC: 6.5 G/DL (ref 6.4–8.2)
PROT UR STRIP-MCNC: NEGATIVE MG/DL
RBC # BLD AUTO: 4.33 M/UL (ref 4.2–5.3)
SERVICE CMNT-IMP: NORMAL
SODIUM SERPL-SCNC: 143 MMOL/L (ref 136–145)
SP GR UR REFRACTOMETRY: 1.01 (ref 1–1.03)
TROPONIN I SERPL HS-MCNC: 16 NG/L (ref 0–54)
UROBILINOGEN UR QL STRIP.AUTO: 0.2 EU/DL (ref 0.2–1)
WBC # BLD AUTO: 8 K/UL (ref 4.6–13.2)
WET PREP GENITAL: NORMAL

## 2023-05-09 PROCEDURE — 87210 SMEAR WET MOUNT SALINE/INK: CPT

## 2023-05-09 PROCEDURE — 6360000004 HC RX CONTRAST MEDICATION: Performed by: NURSE PRACTITIONER

## 2023-05-09 PROCEDURE — 71045 X-RAY EXAM CHEST 1 VIEW: CPT

## 2023-05-09 PROCEDURE — 74177 CT ABD & PELVIS W/CONTRAST: CPT

## 2023-05-09 PROCEDURE — 93005 ELECTROCARDIOGRAM TRACING: CPT | Performed by: NURSE PRACTITIONER

## 2023-05-09 PROCEDURE — 87209 SMEAR COMPLEX STAIN: CPT

## 2023-05-09 PROCEDURE — 81003 URINALYSIS AUTO W/O SCOPE: CPT

## 2023-05-09 PROCEDURE — 80053 COMPREHEN METABOLIC PANEL: CPT

## 2023-05-09 PROCEDURE — 99285 EMERGENCY DEPT VISIT HI MDM: CPT

## 2023-05-09 PROCEDURE — 83690 ASSAY OF LIPASE: CPT

## 2023-05-09 PROCEDURE — 87177 OVA AND PARASITES SMEARS: CPT

## 2023-05-09 PROCEDURE — 84484 ASSAY OF TROPONIN QUANT: CPT

## 2023-05-09 PROCEDURE — 85025 COMPLETE CBC W/AUTO DIFF WBC: CPT

## 2023-05-09 RX ADMIN — IOPAMIDOL 100 ML: 612 INJECTION, SOLUTION INTRAVENOUS at 18:25

## 2023-05-09 ASSESSMENT — PAIN DESCRIPTION - LOCATION: LOCATION: ABDOMEN

## 2023-05-09 ASSESSMENT — PAIN SCALES - GENERAL: PAINLEVEL_OUTOF10: 5

## 2023-05-09 ASSESSMENT — PAIN - FUNCTIONAL ASSESSMENT: PAIN_FUNCTIONAL_ASSESSMENT: 0-10

## 2023-05-09 NOTE — DISCHARGE INSTRUCTIONS
Increase fluid intake and fiber intake, get regular physical activity  Follow-up with PCM and GI as scheduled  Ova and parasite test pending, will call if positive and treatment is needed  Return to care for new or worsening symptoms to include worsening pain, fever/chills, chest pain, shortness of breath, vomiting or other concerning symptoms.

## 2023-05-09 NOTE — ED PROVIDER NOTES
Diagnosis Date    Arthritis     djd spine    Other ill-defined conditions(799.89)     left arm numbness    Other ill-defined conditions(799.89)     hot flashes    Other ill-defined conditions(799.89)     cyst on back    Other ill-defined conditions(799.89)     torn rotator cuff    Psychiatric disorder     anxiety       Past Surgical History:  Past Surgical History:   Procedure Laterality Date    BREAST BIOPSY      right       Family History:  No family history on file. Social History:  Social History     Socioeconomic History    Marital status: Single   Tobacco Use    Smoking status: Every Day    Tobacco comments:     Quit smoking: currently using patch   Substance and Sexual Activity    Alcohol use: No    Drug use: No       Allergies:  No Known Allergies    CURRENT MEDICATIONS      No current facility-administered medications for this encounter. Current Outpatient Medications   Medication Sig Dispense Refill    clorazepate (TRANXENE) 15 MG tablet Take 15 mg by mouth 2 times daily. cyclobenzaprine (FLEXERIL) 10 MG tablet Take 10 mg by mouth 3 times daily as needed      oxyCODONE-acetaminophen (PERCOCET) 5-325 MG per tablet Take 1 tablet by mouth every 4 hours as needed. temazepam (RESTORIL) 30 MG capsule Take by mouth. PHYSICAL EXAM      Vitals:    05/09/23 1549 05/09/23 1727   BP: 125/72 131/75   Pulse: 83 72   Resp: 16 16   Temp: 97.2 °F (36.2 °C) 98.2 °F (36.8 °C)   TempSrc:  Oral   SpO2: 100% 100%   Weight: 114 lb (51.7 kg)    Height: 5' 3\" (1.6 m)      Physical Exam  Vitals and nursing note reviewed. Constitutional:       General: She is not in acute distress. Appearance: She is well-developed and normal weight. She is not ill-appearing or toxic-appearing. HENT:      Head: Normocephalic and atraumatic. Mouth/Throat:      Mouth: Mucous membranes are moist.   Eyes:      Extraocular Movements: Extraocular movements intact.    Cardiovascular:      Rate and Rhythm: Normal

## 2023-05-12 LAB
EKG ATRIAL RATE: 73 BPM
EKG DIAGNOSIS: NORMAL
EKG P AXIS: 77 DEGREES
EKG P-R INTERVAL: 124 MS
EKG Q-T INTERVAL: 386 MS
EKG QRS DURATION: 76 MS
EKG QTC CALCULATION (BAZETT): 425 MS
EKG R AXIS: 74 DEGREES
EKG T AXIS: 41 DEGREES
EKG VENTRICULAR RATE: 73 BPM
O+P SPEC MICRO: NORMAL
O+P STL CONC: NORMAL
SPECIMEN SOURCE: NORMAL

## 2023-07-17 ENCOUNTER — HOSPITAL ENCOUNTER (EMERGENCY)
Facility: HOSPITAL | Age: 68
Discharge: HOME | End: 2023-07-18
Attending: EMERGENCY MEDICINE
Payer: MEDICARE

## 2023-07-17 VITALS
OXYGEN SATURATION: 100 % | SYSTOLIC BLOOD PRESSURE: 145 MMHG | HEART RATE: 91 BPM | HEIGHT: 63 IN | TEMPERATURE: 98 F | DIASTOLIC BLOOD PRESSURE: 75 MMHG | RESPIRATION RATE: 20 BRPM | WEIGHT: 110 LBS | BODY MASS INDEX: 19.49 KG/M2

## 2023-07-17 DIAGNOSIS — R10.9 ABDOMINAL PAIN, UNSPECIFIED ABDOMINAL LOCATION: Primary | ICD-10-CM

## 2023-07-17 DIAGNOSIS — K64.4 EXTERNAL HEMORRHOIDS: ICD-10-CM

## 2023-07-17 DIAGNOSIS — E07.9 THYROID DISORDER: ICD-10-CM

## 2023-07-17 DIAGNOSIS — F41.8 ANXIETY ABOUT HEALTH: ICD-10-CM

## 2023-07-17 LAB
ALBUMIN SERPL-MCNC: 4.1 G/DL (ref 3.4–5)
ALBUMIN/GLOB SERPL: 1.5 (ref 0.8–1.7)
ALP SERPL-CCNC: 75 U/L (ref 45–117)
ALT SERPL-CCNC: 62 U/L (ref 13–56)
ANION GAP SERPL CALC-SCNC: 5 MMOL/L (ref 3–18)
AST SERPL-CCNC: 15 U/L (ref 10–38)
BASOPHILS # BLD: 0.1 K/UL (ref 0–0.1)
BASOPHILS NFR BLD: 1 % (ref 0–2)
BILIRUB SERPL-MCNC: 0.4 MG/DL (ref 0.2–1)
BUN SERPL-MCNC: 8 MG/DL (ref 7–18)
BUN/CREAT SERPL: 11 (ref 12–20)
CALCIUM SERPL-MCNC: 9.4 MG/DL (ref 8.5–10.1)
CHLORIDE SERPL-SCNC: 106 MMOL/L (ref 100–111)
CO2 SERPL-SCNC: 29 MMOL/L (ref 21–32)
CREAT SERPL-MCNC: 0.72 MG/DL (ref 0.6–1.3)
DIFFERENTIAL METHOD BLD: ABNORMAL
EOSINOPHIL # BLD: 0 K/UL (ref 0–0.4)
EOSINOPHIL NFR BLD: 0 % (ref 0–5)
ERYTHROCYTE [DISTWIDTH] IN BLOOD BY AUTOMATED COUNT: 12.5 % (ref 11.6–14.5)
GLOBULIN SER CALC-MCNC: 2.8 G/DL (ref 2–4)
GLUCOSE SERPL-MCNC: 87 MG/DL (ref 74–99)
HCT VFR BLD AUTO: 42.1 % (ref 35–45)
HGB BLD-MCNC: 14 G/DL (ref 12–16)
IMM GRANULOCYTES # BLD AUTO: 0 K/UL (ref 0–0.04)
IMM GRANULOCYTES NFR BLD AUTO: 0 % (ref 0–0.5)
LYMPHOCYTES # BLD: 3.9 K/UL (ref 0.9–3.6)
LYMPHOCYTES NFR BLD: 30 % (ref 21–52)
MCH RBC QN AUTO: 31.3 PG (ref 24–34)
MCHC RBC AUTO-ENTMCNC: 33.3 G/DL (ref 31–37)
MCV RBC AUTO: 94 FL (ref 78–100)
MONOCYTES # BLD: 0.8 K/UL (ref 0.05–1.2)
MONOCYTES NFR BLD: 6 % (ref 3–10)
NEUTS SEG # BLD: 8.2 K/UL (ref 1.8–8)
NEUTS SEG NFR BLD: 63 % (ref 40–73)
NRBC # BLD: 0 K/UL (ref 0–0.01)
NRBC BLD-RTO: 0 PER 100 WBC
PLATELET # BLD AUTO: 326 K/UL (ref 135–420)
PMV BLD AUTO: 9.1 FL (ref 9.2–11.8)
POTASSIUM SERPL-SCNC: 3.4 MMOL/L (ref 3.5–5.5)
PROT SERPL-MCNC: 6.9 G/DL (ref 6.4–8.2)
RBC # BLD AUTO: 4.48 M/UL (ref 4.2–5.3)
SODIUM SERPL-SCNC: 140 MMOL/L (ref 136–145)
WBC # BLD AUTO: 13.1 K/UL (ref 4.6–13.2)

## 2023-07-17 PROCEDURE — 84443 ASSAY THYROID STIM HORMONE: CPT

## 2023-07-17 PROCEDURE — 99283 EMERGENCY DEPT VISIT LOW MDM: CPT

## 2023-07-17 PROCEDURE — 85025 COMPLETE CBC W/AUTO DIFF WBC: CPT

## 2023-07-17 PROCEDURE — 80053 COMPREHEN METABOLIC PANEL: CPT

## 2023-07-17 PROCEDURE — 83735 ASSAY OF MAGNESIUM: CPT

## 2023-07-17 NOTE — ED TRIAGE NOTES
Pt reports various ailments from a cut on her right arm to her suspecting she has E.coli. Pt also reports she may have melanoma and vaginal prolapse.

## 2023-07-18 LAB
MAGNESIUM SERPL-MCNC: 2.4 MG/DL (ref 1.6–2.6)
TSH SERPL DL<=0.05 MIU/L-ACNC: 2.37 UIU/ML (ref 0.36–3.74)

## 2023-07-18 PROCEDURE — 6370000000 HC RX 637 (ALT 250 FOR IP): Performed by: EMERGENCY MEDICINE

## 2023-07-18 RX ORDER — POLYETHYLENE GLYCOL-3350 AND ELECTROLYTES 236; 6.74; 5.86; 2.97; 22.74 G/274.31G; G/274.31G; G/274.31G; G/274.31G; G/274.31G
POWDER, FOR SOLUTION ORAL
COMMUNITY
Start: 2023-05-02

## 2023-07-18 RX ORDER — OXYCODONE HYDROCHLORIDE 10 MG/1
TABLET ORAL
COMMUNITY
Start: 2023-07-06

## 2023-07-18 RX ORDER — QUETIAPINE FUMARATE 25 MG/1
25 TABLET, FILM COATED ORAL 2 TIMES DAILY
Qty: 60 TABLET | Refills: 3 | Status: SHIPPED | OUTPATIENT
Start: 2023-07-18

## 2023-07-18 RX ORDER — TRIAMCINOLONE ACETONIDE 1 MG/G
CREAM TOPICAL
COMMUNITY
Start: 2023-06-13

## 2023-07-18 RX ORDER — GABAPENTIN 300 MG/1
CAPSULE ORAL
COMMUNITY
Start: 2023-05-29

## 2023-07-18 RX ORDER — MEGESTROL ACETATE 20 MG/1
20 TABLET ORAL DAILY
Qty: 30 TABLET | Refills: 11 | COMMUNITY
Start: 2023-02-23 | End: 2024-02-23

## 2023-07-18 RX ORDER — ONDANSETRON 4 MG/1
TABLET, ORALLY DISINTEGRATING ORAL
COMMUNITY
Start: 2023-07-13

## 2023-07-18 RX ORDER — CHLORZOXAZONE 375 MG/1
TABLET ORAL
COMMUNITY
Start: 2023-07-11

## 2023-07-18 RX ORDER — TRAMADOL HYDROCHLORIDE 50 MG/1
TABLET ORAL
COMMUNITY
Start: 2023-07-06

## 2023-07-18 RX ORDER — HYDROXYZINE HYDROCHLORIDE 25 MG/1
TABLET, FILM COATED ORAL
COMMUNITY
Start: 2023-07-03

## 2023-07-18 RX ORDER — QUETIAPINE FUMARATE 25 MG/1
25 TABLET, FILM COATED ORAL
Status: COMPLETED | OUTPATIENT
Start: 2023-07-18 | End: 2023-07-18

## 2023-07-18 RX ORDER — NITROFURANTOIN 25; 75 MG/1; MG/1
CAPSULE ORAL
COMMUNITY
Start: 2023-07-11

## 2023-07-18 RX ORDER — PREDNISONE 20 MG/1
TABLET ORAL
COMMUNITY
Start: 2023-06-28

## 2023-07-18 RX ORDER — DICYCLOMINE HYDROCHLORIDE 10 MG/1
CAPSULE ORAL
COMMUNITY
Start: 2023-07-11

## 2023-07-18 RX ADMIN — QUETIAPINE FUMARATE 25 MG: 25 TABLET ORAL at 01:07

## 2023-07-18 NOTE — ED NOTES
Patient given discharge papers. Patient understanding of discharge.  Patient ambulatory out of ED       Jody Newman RN  07/18/23 [id number]

## 2023-08-01 ENCOUNTER — CLINICAL DOCUMENTATION (OUTPATIENT)
Facility: HOSPITAL | Age: 68
End: 2023-08-01

## 2023-08-01 NOTE — PROGRESS NOTES
Received multiple calls from patient regarding ongoing concern for E.coli. Records reviewed from ED, Neurology, Urgent Care, GI and PCP. No current infectious process has been identified in the last 4-6 weeks. Patient has also called [facility] group for advice. Advised patient that she needs to discuss with her PCP and Dr. Mya Lafleur to arrange for any appropriate testing as needed. Continue to recommend referral to Psychiatry.

## 2023-08-06 PROBLEM — F22 DELUSIONS OF PARASITOSIS (HCC): Status: ACTIVE | Noted: 2022-12-19

## 2023-08-06 PROBLEM — F41.8 ANXIETY ABOUT HEALTH: Status: ACTIVE | Noted: 2021-03-23

## 2023-08-06 PROBLEM — M79.7 FIBROMYALGIA: Status: ACTIVE | Noted: 2018-09-04

## 2023-08-06 PROBLEM — K21.9 GERD (GASTROESOPHAGEAL REFLUX DISEASE): Status: ACTIVE | Noted: 2017-11-06
